# Patient Record
Sex: FEMALE | Race: BLACK OR AFRICAN AMERICAN | NOT HISPANIC OR LATINO | Employment: FULL TIME | URBAN - METROPOLITAN AREA
[De-identification: names, ages, dates, MRNs, and addresses within clinical notes are randomized per-mention and may not be internally consistent; named-entity substitution may affect disease eponyms.]

---

## 2017-01-03 ENCOUNTER — TELEPHONE (OUTPATIENT)
Dept: FAMILY MEDICINE | Facility: CLINIC | Age: 22
End: 2017-01-03

## 2017-03-14 ENCOUNTER — OFFICE VISIT (OUTPATIENT)
Dept: FAMILY MEDICINE | Facility: CLINIC | Age: 22
End: 2017-03-14
Payer: COMMERCIAL

## 2017-03-14 ENCOUNTER — LAB VISIT (OUTPATIENT)
Dept: LAB | Facility: HOSPITAL | Age: 22
End: 2017-03-14
Attending: FAMILY MEDICINE
Payer: COMMERCIAL

## 2017-03-14 VITALS
HEART RATE: 75 BPM | RESPIRATION RATE: 16 BRPM | BODY MASS INDEX: 21.84 KG/M2 | HEIGHT: 67 IN | OXYGEN SATURATION: 97 % | TEMPERATURE: 99 F | DIASTOLIC BLOOD PRESSURE: 68 MMHG | SYSTOLIC BLOOD PRESSURE: 100 MMHG | WEIGHT: 139.13 LBS

## 2017-03-14 DIAGNOSIS — R63.0 POOR APPETITE: ICD-10-CM

## 2017-03-14 DIAGNOSIS — Z86.39 HISTORY OF HYPOTHYROIDISM: ICD-10-CM

## 2017-03-14 DIAGNOSIS — L65.9 HAIR THINNING: ICD-10-CM

## 2017-03-14 DIAGNOSIS — R63.1 INCREASED THIRST: ICD-10-CM

## 2017-03-14 DIAGNOSIS — R63.4 WEIGHT LOSS: ICD-10-CM

## 2017-03-14 DIAGNOSIS — R53.83 FATIGUE, UNSPECIFIED TYPE: ICD-10-CM

## 2017-03-14 DIAGNOSIS — R53.83 FATIGUE, UNSPECIFIED TYPE: Primary | ICD-10-CM

## 2017-03-14 DIAGNOSIS — R51.9 GENERALIZED HEADACHES: ICD-10-CM

## 2017-03-14 LAB
25(OH)D3+25(OH)D2 SERPL-MCNC: 12 NG/ML
ALBUMIN SERPL BCP-MCNC: 4.4 G/DL
ALP SERPL-CCNC: 79 U/L
ALT SERPL W/O P-5'-P-CCNC: 12 U/L
ANION GAP SERPL CALC-SCNC: 8 MMOL/L
AST SERPL-CCNC: 19 U/L
BASOPHILS # BLD AUTO: 0.03 K/UL
BASOPHILS NFR BLD: 0.8 %
BILIRUB SERPL-MCNC: 0.7 MG/DL
BUN SERPL-MCNC: 11 MG/DL
CALCIUM SERPL-MCNC: 9.4 MG/DL
CHLORIDE SERPL-SCNC: 105 MMOL/L
CO2 SERPL-SCNC: 28 MMOL/L
CREAT SERPL-MCNC: 0.8 MG/DL
DIFFERENTIAL METHOD: NORMAL
EOSINOPHIL # BLD AUTO: 0.3 K/UL
EOSINOPHIL NFR BLD: 7.6 %
ERYTHROCYTE [DISTWIDTH] IN BLOOD BY AUTOMATED COUNT: 13.8 %
EST. GFR  (AFRICAN AMERICAN): >60 ML/MIN/1.73 M^2
EST. GFR  (NON AFRICAN AMERICAN): >60 ML/MIN/1.73 M^2
GLUCOSE SERPL-MCNC: 83 MG/DL
HCT VFR BLD AUTO: 39.3 %
HGB BLD-MCNC: 12.8 G/DL
LYMPHOCYTES # BLD AUTO: 1.3 K/UL
LYMPHOCYTES NFR BLD: 33.8 %
MCH RBC QN AUTO: 27.5 PG
MCHC RBC AUTO-ENTMCNC: 32.6 %
MCV RBC AUTO: 85 FL
MONOCYTES # BLD AUTO: 0.3 K/UL
MONOCYTES NFR BLD: 8.6 %
NEUTROPHILS # BLD AUTO: 1.9 K/UL
NEUTROPHILS NFR BLD: 49.2 %
PLATELET # BLD AUTO: 211 K/UL
PMV BLD AUTO: 12.3 FL
POTASSIUM SERPL-SCNC: 4.1 MMOL/L
PROT SERPL-MCNC: 8.6 G/DL
RBC # BLD AUTO: 4.65 M/UL
SODIUM SERPL-SCNC: 141 MMOL/L
T3FREE SERPL-MCNC: 3.1 PG/ML
T4 FREE SERPL-MCNC: 0.95 NG/DL
TSH SERPL DL<=0.005 MIU/L-ACNC: 1.14 UIU/ML
VIT B12 SERPL-MCNC: 602 PG/ML
WBC # BLD AUTO: 3.94 K/UL

## 2017-03-14 PROCEDURE — 85025 COMPLETE CBC W/AUTO DIFF WBC: CPT

## 2017-03-14 PROCEDURE — 84481 FREE ASSAY (FT-3): CPT

## 2017-03-14 PROCEDURE — 84443 ASSAY THYROID STIM HORMONE: CPT

## 2017-03-14 PROCEDURE — 36415 COLL VENOUS BLD VENIPUNCTURE: CPT | Mod: PO

## 2017-03-14 PROCEDURE — 83036 HEMOGLOBIN GLYCOSYLATED A1C: CPT

## 2017-03-14 PROCEDURE — 99999 PR PBB SHADOW E&M-EST. PATIENT-LVL III: CPT | Mod: PBBFAC,,, | Performed by: NURSE PRACTITIONER

## 2017-03-14 PROCEDURE — 84439 ASSAY OF FREE THYROXINE: CPT

## 2017-03-14 PROCEDURE — 80053 COMPREHEN METABOLIC PANEL: CPT

## 2017-03-14 PROCEDURE — 99214 OFFICE O/P EST MOD 30 MIN: CPT | Mod: S$GLB,,, | Performed by: NURSE PRACTITIONER

## 2017-03-14 PROCEDURE — 82306 VITAMIN D 25 HYDROXY: CPT

## 2017-03-14 PROCEDURE — 82607 VITAMIN B-12: CPT

## 2017-03-14 NOTE — PROGRESS NOTES
"Subjective:       Patient ID: Noah Dan is a 21 y.o. female.    Chief Complaint: Thyroid Problem  She was last seen in primary care by [facility] on 11/17/2016. This is her first time seeing me in the clinic.  HPI   States "feeling tired and in slump all of the time and hair is falling out".  States was diagnosed with hypothyroidism during pregnancy over a year ago. States she stopped taking medication because levels were fine.  Fatigue more noticeable in past 3 months.  Vitals:    03/14/17 1340   BP: 100/68   Pulse: 75   Resp: 16   Temp: 98.6 °F (37 °C)     Review of Systems   Constitutional: Positive for fatigue and unexpected weight change. Negative for fever.        Weight loss   Neurological: Positive for headaches.   All other systems reviewed and are negative.      States not on thyroid meds for one year and has not had levels checked and felt this way when "thyroid was off during pregnancy"  Thirsty a lot at night, last menses was last week    Objective:      Physical Exam   Constitutional: She is oriented to person, place, and time. She appears well-developed and well-nourished.   HENT:   Head: Normocephalic and atraumatic.   Right Ear: Hearing, tympanic membrane, external ear and ear canal normal.   Left Ear: Hearing, tympanic membrane, external ear and ear canal normal.   Nose: Nose normal.   Mouth/Throat: Oropharynx is clear and moist.   Eyes: Lids are normal.   Neck: Normal range of motion. Neck supple.   Cardiovascular: Normal rate, regular rhythm and normal heart sounds.    Pulmonary/Chest: Effort normal and breath sounds normal.   Abdominal: Soft. Bowel sounds are normal.   Neurological: She is alert and oriented to person, place, and time.   Skin: Skin is warm, dry and intact.   Psychiatric: She has a normal mood and affect. Her speech is normal and behavior is normal. Judgment and thought content normal. Cognition and memory are normal.   Nursing note and vitals reviewed.    "   Assessment & Plan:       Fatigue, unspecified type  -     Vitamin B12; Future; Expected date: 3/14/17    Weight loss  -     TSH; Future; Expected date: 3/14/17  -     T3, free; Future; Expected date: 3/14/17  -     CBC auto differential; Future; Expected date: 3/14/17  -     Comprehensive metabolic panel; Future; Expected date: 3/14/17  -     Hemoglobin A1c; Future; Expected date: 3/14/17  -     Vitamin D; Future; Expected date: 3/14/17  -     Vitamin B12; Future; Expected date: 3/14/17    Hair thinning  -     TSH; Future; Expected date: 3/14/17  -     T3, free; Future; Expected date: 3/14/17  -     Vitamin D; Future; Expected date: 3/14/17    Poor appetite  -     TSH; Future; Expected date: 3/14/17  -     T3, free; Future; Expected date: 3/14/17  -     Hemoglobin A1c; Future; Expected date: 3/14/17  -     Vitamin D; Future; Expected date: 3/14/17  -     Vitamin B12; Future; Expected date: 3/14/17    History of hypothyroidism  -     TSH; Future; Expected date: 3/14/17  -     T3, free; Future; Expected date: 3/14/17  -     T4, free; Future; Expected date: 3/14/17  -     Vitamin D; Future; Expected date: 3/14/17    Increased thirst  -     Hemoglobin A1c; Future; Expected date: 3/14/17    Generalized headaches          Return in about 2 weeks (around 3/28/2017).

## 2017-03-14 NOTE — MR AVS SNAPSHOT
"    [facility]  [address]  [place] [address]  Phone: [phone]  Fax: [phone]                  Noah Dan   3/14/2017 1:20 PM   Office Visit    Description:  Female : 1995   Provider:  Marcelina Phoenix NP   Department:  [facility]           Reason for Visit     Thyroid Problem           Diagnoses this Visit        Comments    Fatigue, unspecified type    -  Primary     Weight loss         Hair thinning         Poor appetite         History of hypothyroidism         Increased thirst         Generalized headaches                To Do List           Future Appointments        Provider Department Dept Phone    3/28/2017 8:40 AM Marcelina Phoenix NP [facility] [phone]      Goals (5 Years of Data)     None      Follow-Up and Disposition     Return in about 2 weeks (around 3/28/2017).      [facility] On Call     [facility] On Call Nurse Care Line -  Assistance  Registered nurses in the [facility] provide clinical advisement, health education, appointment booking, and other advisory services.  Call for this free service at [phone].             Medications                Verify that the below list of medications is an accurate representation of the medications you are currently taking.  If none reported, the list may be blank. If incorrect, please contact your healthcare provider. Carry this list with you in case of emergency.                Clinical Reference Information           Your Vitals Were     BP Pulse Temp Resp Height Weight    100/68 75 98.6 °F (37 °C) (Oral) 16 5' 7" (1.702 m) 63.1 kg (139 lb 1.8 oz)    Last Period SpO2 BMI          2017 97% 21.79 kg/m2        Blood Pressure          Most Recent Value    BP  100/68      Allergies as of 3/14/2017     No Known Allergies      Immunizations Administered on Date of Encounter - 3/14/2017     None      Orders Placed During Today's Visit     Future " Labs/Procedures Expected by Expires    CBC auto differential  3/14/2017 5/13/2018    Comprehensive metabolic panel  3/14/2017 6/12/2017    Hemoglobin A1c  3/14/2017 5/13/2018    T3, free  3/14/2017 5/13/2018    T4, free  3/14/2017 5/13/2018    TSH  3/14/2017 5/13/2018    Vitamin B12  3/14/2017 5/13/2018    Vitamin D  3/14/2017 9/10/2017      Language Assistance Services     ATTENTION: Language assistance services are available, free of charge. Please call [phone].      ATENCIÓN: Si habla español, tiene a castro disposición servicios gratuitos de asistencia lingüística. Llame al [phone].     CHÚ Ý: N?u b?n nói Ti?ng Vi?t, có các d?ch v? h? tr? ngôn ng? mi?n phí dành cho b?n. G?i s? [phone].         [facility] complies with applicable Federal civil rights laws and does not discriminate on the basis of race, color, national origin, age, disability, or sex.

## 2017-03-16 DIAGNOSIS — E55.9 VITAMIN D DEFICIENCY: Primary | ICD-10-CM

## 2017-03-16 LAB
ESTIMATED AVG GLUCOSE: 103 MG/DL
HBA1C MFR BLD HPLC: 5.2 %

## 2017-03-16 RX ORDER — ERGOCALCIFEROL 1.25 MG/1
50000 CAPSULE ORAL
Qty: 4 CAPSULE | Refills: 5 | Status: SHIPPED | OUTPATIENT
Start: 2017-03-16 | End: 2019-06-06 | Stop reason: CLARIF

## 2017-03-16 NOTE — PROGRESS NOTES
Discussed all resultsresults with patient via telephone. Script sent to her pharmacy of choice for vitamin D.

## 2017-03-28 ENCOUNTER — OFFICE VISIT (OUTPATIENT)
Dept: FAMILY MEDICINE | Facility: CLINIC | Age: 22
End: 2017-03-28
Payer: COMMERCIAL

## 2017-03-28 VITALS
DIASTOLIC BLOOD PRESSURE: 64 MMHG | BODY MASS INDEX: 22.29 KG/M2 | SYSTOLIC BLOOD PRESSURE: 98 MMHG | HEART RATE: 67 BPM | HEIGHT: 67 IN | TEMPERATURE: 99 F | WEIGHT: 142 LBS | OXYGEN SATURATION: 100 %

## 2017-03-28 DIAGNOSIS — R53.83 FATIGUE, UNSPECIFIED TYPE: ICD-10-CM

## 2017-03-28 DIAGNOSIS — E55.9 VITAMIN D INSUFFICIENCY: Primary | ICD-10-CM

## 2017-03-28 PROCEDURE — 1160F RVW MEDS BY RX/DR IN RCRD: CPT | Mod: S$GLB,,, | Performed by: NURSE PRACTITIONER

## 2017-03-28 PROCEDURE — 99213 OFFICE O/P EST LOW 20 MIN: CPT | Mod: S$GLB,,, | Performed by: NURSE PRACTITIONER

## 2017-03-28 PROCEDURE — 99999 PR PBB SHADOW E&M-EST. PATIENT-LVL III: CPT | Mod: PBBFAC,,, | Performed by: NURSE PRACTITIONER

## 2017-03-28 NOTE — PATIENT INSTRUCTIONS
Vitamin D  Does this test have other names?  25-hydroxyvitamin D (25-high-DROX-ee-VIE-tuh-min D), 25(OH)D  What is this test?  Vitamin D is mainly found in fortified dairy foods, juice, breakfast cereal, and certain fish. This vitamin plays many roles in the body. But because it helps the body absorb calcium from foods and supplements, it's particularly important for bone health. Vitamin D has many additional roles in the body.  Vitamin D comes in several forms. When ultraviolet light, such as sunlight, hits your skin, it creates vitamin D3. D2 is used to fortify dairy foods. Both of these are further processed by your liver and kidneys into a form your body can use. Most tests for vitamin D check the level of a form circulating in the body called 25-hydroxyvitamin D, also called 25(OH)D.   Why do I need this test?  Vitamin D testing has become much more popular in recent years. Your healthcare provider may check your vitamin D levels to find out if you have any risks to bone health. These might be:  · Low calcium  · Soft bones caused by low vitamin D or problems using it (osteomalacia)  · Osteopenia  · Osteoporosis  · Rickets, in children  You may also need this test if you are at risk for low vitamin D levels. Risks include:  · Being an older adult  · Having difficulty absorbing fat from your diet  · Having chronic kidney disease  · Have dark skin pigmentation  · Being a  baby  Vitamin D has many effects in the body. You may need this test to help your provider diagnose or treat:  · Problems with the parathyroid gland  · Cancer  · Autoimmune diseases such as multiple sclerosis and Crohn's disease  · Psoriasis  · Asthma  · Weakness or falls    What other tests might I have along with this test?  A healthcare provider may also want to check your parathyroid hormone levels and your calcium levels.   What do my test results mean?  A result for a lab test may be affected by many things, including the method  the laboratory uses to do the test. If your test results are different from the normal value, you may not have a problem. To learn what the results mean for you, talk with your healthcare provider.  Children and adults need more than 30 nanograms per milliliter (ng/ml) of vitamin D. The optimal level of 25(OH)D is usually between 30 and 60 ng/mL. Recommended daily amounts range from 400 to 800 international units (IU) per day based on your age.  Levels lower than normal can mean you are:  · Not making enough vitamin D on your own  · Not getting enough vitamin D in your diet  · Not absorbing vitamin D from your food as you should  Lower levels may also mean that your body is not converting the vitamin as it should. This might be because of kidney or liver disease.  Above-normal levels may be a sign that you're taking too much in supplement form.   How is this test done?  The test requires a blood sample, which is drawn through a needle from a vein in your arm.  Does this test pose any risks?  Taking a blood sample with a needle carries risks that include bleeding, infection, bruising, and a sense of lightheadedness. When the needle pricks your arm, you may feel a slight stinging sensation or pain. Afterward, the site may be slightly sore.   What might affect my test results?  The amount of time you spend in the sunlight, your diet, and whether you take vitamin D in supplement form can affect your vitamin D levels. Ask your healthcare provider if any health conditions you have or medicines you take could affect your results.  How do I get ready for this test?  Tell your healthcare provider if you take vitamin D supplements. Also be sure your provider knows about all medicines, herbs, vitamins, and supplements you are taking. This includes medicines that don't need a prescription and any illicit drugs you may use.   Date Last Reviewed: 10/12/2015  © 1626-6820 [employer]. [address] [place] [address], [facility]  [place] [address]. All rights reserved. This information is not intended as a substitute for professional medical care. Always follow your healthcare professional's instructions.

## 2017-03-28 NOTE — MR AVS SNAPSHOT
"    [facility]  [address]  [place] [address]  Phone: [phone]  Fax: [phone]                  Noah Dan   3/28/2017 8:40 AM   Office Visit    Description:  Female : 1995   Provider:  Marcelina Phoenix NP   Department:  [facility]           Reason for Visit     2 Week FOllow-up, Fatigue                To Do List           Goals (5 Years of Data)     None      [facility]     [facility] On Call Nurse Care Line -  Assistance  Registered nurses in the [facility] provide clinical advisement, health education, appointment booking, and other advisory services.  Call for this free service at [phone].             Medications                Verify that the below list of medications is an accurate representation of the medications you are currently taking.  If none reported, the list may be blank. If incorrect, please contact your healthcare provider. Carry this list with you in case of emergency.           Current Medications     ergocalciferol (ERGOCALCIFEROL) 50,000 unit Cap Take 1 capsule (50,000 Units total) by mouth every 7 days.           Clinical Reference Information           Your Vitals Were     BP Pulse Temp Height Weight Last Period     67 98.6 °F (37 °C) (Oral) 5' 7" (1.702 m) 64.4 kg (141 lb 15.6 oz) 2017    SpO2 BMI             100% 22.24 kg/m2         Blood Pressure          Most Recent Value    BP  98/64      Allergies as of 3/28/2017     No Known Allergies      Immunizations Administered on Date of Encounter - 3/28/2017     None      Instructions      Vitamin D  Does this test have other names?  25-hydroxyvitamin D (25-high-DROX-ee-VIE-tuh-min D), 25(OH)D  What is this test?  Vitamin D is mainly found in fortified dairy foods, juice, breakfast cereal, and certain fish. This vitamin plays many roles in the body. But because it helps the body absorb calcium from foods and supplements, it's particularly " important for bone health. Vitamin D has many additional roles in the body.  Vitamin D comes in several forms. When ultraviolet light, such as sunlight, hits your skin, it creates vitamin D3. D2 is used to fortify dairy foods. Both of these are further processed by your liver and kidneys into a form your body can use. Most tests for vitamin D check the level of a form circulating in the body called 25-hydroxyvitamin D, also called 25(OH)D.   Why do I need this test?  Vitamin D testing has become much more popular in recent years. Your healthcare provider may check your vitamin D levels to find out if you have any risks to bone health. These might be:  · Low calcium  · Soft bones caused by low vitamin D or problems using it (osteomalacia)  · Osteopenia  · Osteoporosis  · Rickets, in children  You may also need this test if you are at risk for low vitamin D levels. Risks include:  · Being an older adult  · Having difficulty absorbing fat from your diet  · Having chronic kidney disease  · Have dark skin pigmentation  · Being a  baby  Vitamin D has many effects in the body. You may need this test to help your provider diagnose or treat:  · Problems with the parathyroid gland  · Cancer  · Autoimmune diseases such as multiple sclerosis and Crohn's disease  · Psoriasis  · Asthma  · Weakness or falls    What other tests might I have along with this test?  A healthcare provider may also want to check your parathyroid hormone levels and your calcium levels.   What do my test results mean?  A result for a lab test may be affected by many things, including the method the laboratory uses to do the test. If your test results are different from the normal value, you may not have a problem. To learn what the results mean for you, talk with your healthcare provider.  Children and adults need more than 30 nanograms per milliliter (ng/ml) of vitamin D. The optimal level of 25(OH)D is usually between 30 and 60 ng/mL.  Recommended daily amounts range from 400 to 800 international units (IU) per day based on your age.  Levels lower than normal can mean you are:  · Not making enough vitamin D on your own  · Not getting enough vitamin D in your diet  · Not absorbing vitamin D from your food as you should  Lower levels may also mean that your body is not converting the vitamin as it should. This might be because of kidney or liver disease.  Above-normal levels may be a sign that you're taking too much in supplement form.   How is this test done?  The test requires a blood sample, which is drawn through a needle from a vein in your arm.  Does this test pose any risks?  Taking a blood sample with a needle carries risks that include bleeding, infection, bruising, and a sense of lightheadedness. When the needle pricks your arm, you may feel a slight stinging sensation or pain. Afterward, the site may be slightly sore.   What might affect my test results?  The amount of time you spend in the sunlight, your diet, and whether you take vitamin D in supplement form can affect your vitamin D levels. Ask your healthcare provider if any health conditions you have or medicines you take could affect your results.  How do I get ready for this test?  Tell your healthcare provider if you take vitamin D supplements. Also be sure your provider knows about all medicines, herbs, vitamins, and supplements you are taking. This includes medicines that don't need a prescription and any illicit drugs you may use.   Date Last Reviewed: 10/12/2015  © 2076-0228 [employer]. [address] [place] [address]. All rights reserved. This information is not intended as a substitute for professional medical care. Always follow your healthcare professional's instructions.             Language Assistance Services     ATTENTION: Language assistance services are available, free of charge. Please call [phone].      ATENCIÓN: Scott latif  tiene a castro disposición servicios gratuitos de asistencia lingüística. Lluzma al [phone].     MARLA Ý: N?u b?n nói Ti?ng Vi?t, có các d?ch v? h? tr? ngôn ng? mi?n phí dành cho b?n. G?i s? [phone].         [facility] complies with applicable Federal civil rights laws and does not discriminate on the basis of race, color, national origin, age, disability, or sex.

## 2017-03-28 NOTE — PROGRESS NOTES
"Subjective:       Patient ID: Noah aDn is a 21 y.o. female.    Chief Complaint: 2 Week FOllow-up, Fatigue  She was last seen in primary care by me on 03/14/2017  HPI   She is here to follow up on her labs.  Vitals:    03/28/17 0832   BP: 98/64   Pulse: 67   Temp: 98.6 °F (37 °C)     Review of Systems    States she is feeling a little less fatigued and has begun working on getting self "organized on a schedule". States this has helped some of her fatigue.  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  Objective:      Physical Exam   Constitutional: She is oriented to person, place, and time. She appears well-developed and well-nourished.   HENT:   Head: Normocephalic.   Right Ear: External ear normal.   Left Ear: External ear normal.   Nose: Nose normal.   Mouth/Throat: Oropharynx is clear and moist.   Neck: Normal range of motion. Neck supple.   Cardiovascular: Normal rate and regular rhythm.    Pulmonary/Chest: Effort normal.   Neurological: She is alert and oriented to person, place, and time.   Skin: Skin is warm and dry.   Psychiatric: She has a normal mood and affect. Her speech is normal and behavior is normal. Judgment and thought content normal. Cognition and memory are normal.   State sometimes she has moments when she   Nursing note and vitals reviewed.      Assessment & Plan:       Vitamin D insufficiency    Fatigue, unspecified type        Continue to take ergocalciferol ordered every 7 days  Discussed lifestyle changes in detail such as a planning calendar for dates and times  Utilizing her strong support system with family members to rest.  Be sure to get adequate sleep daily   She will contact clinic if she feels that her fatigue worsens or any signs of anxiety or depression. She verbalized understanding  No Follow-up on file.  "

## 2017-06-26 ENCOUNTER — TELEPHONE (OUTPATIENT)
Dept: FAMILY MEDICINE | Facility: CLINIC | Age: 22
End: 2017-06-26

## 2017-06-26 DIAGNOSIS — Z23 NEED FOR HEPATITIS B VACCINATION: Primary | ICD-10-CM

## 2017-06-26 NOTE — TELEPHONE ENCOUNTER
----- Message from Giovana Wallace sent at 6/26/2017 10:39 AM CDT -----  Contact: Noah  Patient is asking for appointment for third HEP B injection. Asking for Wednesday or Thursday this week. Please call [phone]. Thanks!

## 2017-06-26 NOTE — TELEPHONE ENCOUNTER
Spoke w/ pt. Req order for 3rd Hep B inj. Advised will need to schedule appt to estab care with a new pcp. States will speak w/ her mom. Please advise on order.

## 2017-06-29 ENCOUNTER — CLINICAL SUPPORT (OUTPATIENT)
Dept: FAMILY MEDICINE | Facility: CLINIC | Age: 22
End: 2017-06-29
Payer: COMMERCIAL

## 2017-06-29 ENCOUNTER — TELEPHONE (OUTPATIENT)
Dept: FAMILY MEDICINE | Facility: CLINIC | Age: 22
End: 2017-06-29

## 2017-06-29 DIAGNOSIS — Z23 NEED FOR HEPATITIS B VACCINATION: Primary | ICD-10-CM

## 2017-06-29 PROCEDURE — 90471 IMMUNIZATION ADMIN: CPT | Mod: S$GLB,,, | Performed by: FAMILY MEDICINE

## 2017-06-29 PROCEDURE — 90746 HEPB VACCINE 3 DOSE ADULT IM: CPT | Mod: S$GLB,,, | Performed by: FAMILY MEDICINE

## 2017-06-29 PROCEDURE — 99499 UNLISTED E&M SERVICE: CPT | Mod: S$GLB,,, | Performed by: FAMILY MEDICINE

## 2018-06-29 ENCOUNTER — TELEPHONE (OUTPATIENT)
Dept: FAMILY MEDICINE | Facility: CLINIC | Age: 23
End: 2018-06-29

## 2018-06-29 NOTE — TELEPHONE ENCOUNTER
----- Message from Shayan Meek sent at 6/29/2018 11:25 AM CDT -----  Contact: self  Please fax immunization records to [phone]. Call [phone] (home)   Thanks!

## 2019-06-11 PROBLEM — J35.1 HYPERTROPHY OF TONSILS: Status: ACTIVE | Noted: 2019-06-11

## 2021-05-06 ENCOUNTER — PATIENT MESSAGE (OUTPATIENT)
Dept: RESEARCH | Facility: HOSPITAL | Age: 26
End: 2021-05-06

## 2023-11-20 ENCOUNTER — TELEPHONE (OUTPATIENT)
Dept: RHEUMATOLOGY | Facility: CLINIC | Age: 28
End: 2023-11-20
Payer: COMMERCIAL

## 2023-11-20 NOTE — TELEPHONE ENCOUNTER
----- Message from Zenia Dumont LPN sent at 11/20/2023 12:24 PM CST -----    ----- Message -----  From: Tevin Gibson  Sent: 11/20/2023  10:20 AM CST  To: [facility] Rheumatology Clinical Support Staff    Type:  Sooner Appointment Request    Caller is requesting a sooner appointment.  Caller declined first available appointment listed below.  Caller will not accept being placed on the waitlist and is requesting a message be sent to doctor.    Name of Caller:  pt   When is the first available appointment?  NA   Symptoms:  2nd opinion/ abnormal labs positive ARIADNA   Would the patient rather a call back or a response via [facility]? Call back   Best Call Back Number:  [phone]    Additional Information:  pt stated she would like to be advised in regards to boy appt please call pt back to advise and boy or add to waitlist asap thanks!